# Patient Record
Sex: MALE | Race: BLACK OR AFRICAN AMERICAN | Employment: FULL TIME | URBAN - METROPOLITAN AREA
[De-identification: names, ages, dates, MRNs, and addresses within clinical notes are randomized per-mention and may not be internally consistent; named-entity substitution may affect disease eponyms.]

---

## 2017-04-23 ENCOUNTER — HOSPITAL ENCOUNTER (EMERGENCY)
Age: 22
Discharge: HOME | End: 2017-04-23
Attending: EMERGENCY MEDICINE | Admitting: EMERGENCY MEDICINE
Payer: SELF-PAY

## 2017-04-23 VITALS
HEART RATE: 69 BPM | WEIGHT: 280 LBS | OXYGEN SATURATION: 99 % | TEMPERATURE: 99 F | DIASTOLIC BLOOD PRESSURE: 113 MMHG | SYSTOLIC BLOOD PRESSURE: 162 MMHG | BODY MASS INDEX: 39.2 KG/M2 | RESPIRATION RATE: 18 BRPM | HEIGHT: 71 IN

## 2017-04-23 DIAGNOSIS — R03.0 ELEVATED BLOOD PRESSURE READING: ICD-10-CM

## 2017-04-23 DIAGNOSIS — H65.191 OTHER ACUTE NONSUPPURATIVE OTITIS MEDIA OF RIGHT EAR, RECURRENCE NOT SPECIFIED: Primary | ICD-10-CM

## 2017-04-23 DIAGNOSIS — J02.9 ACUTE PHARYNGITIS, UNSPECIFIED ETIOLOGY: ICD-10-CM

## 2017-04-23 PROCEDURE — 99282 EMERGENCY DEPT VISIT SF MDM: CPT

## 2017-04-23 PROCEDURE — 87081 CULTURE SCREEN ONLY: CPT | Performed by: EMERGENCY MEDICINE

## 2017-04-23 RX ORDER — IBUPROFEN 600 MG/1
600 TABLET ORAL
Qty: 20 TAB | Refills: 0 | Status: SHIPPED | OUTPATIENT
Start: 2017-04-23

## 2017-04-23 RX ORDER — AMOXICILLIN 500 MG/1
500 TABLET, FILM COATED ORAL 3 TIMES DAILY
Qty: 21 TAB | Refills: 0 | Status: SHIPPED | OUTPATIENT
Start: 2017-04-23 | End: 2017-04-30

## 2017-04-23 NOTE — ED NOTES
Patient advised to f/u with PCP related to elevation in blood pressure. Patient voices understanding.

## 2017-04-23 NOTE — DISCHARGE INSTRUCTIONS
Sore Throat: Care Instructions  Your Care Instructions    Infection by bacteria or a virus causes most sore throats. Cigarette smoke, dry air, air pollution, allergies, and yelling can also cause a sore throat. Sore throats can be painful and annoying. Fortunately, most sore throats go away on their own. If you have a bacterial infection, your doctor may prescribe antibiotics. Follow-up care is a key part of your treatment and safety. Be sure to make and go to all appointments, and call your doctor if you are having problems. It's also a good idea to know your test results and keep a list of the medicines you take. How can you care for yourself at home? · If your doctor prescribed antibiotics, take them as directed. Do not stop taking them just because you feel better. You need to take the full course of antibiotics. · Gargle with warm salt water once an hour to help reduce swelling and relieve discomfort. Use 1 teaspoon of salt mixed in 1 cup of warm water. · Take an over-the-counter pain medicine, such as acetaminophen (Tylenol), ibuprofen (Advil, Motrin), or naproxen (Aleve). Read and follow all instructions on the label. · Be careful when taking over-the-counter cold or flu medicines and Tylenol at the same time. Many of these medicines have acetaminophen, which is Tylenol. Read the labels to make sure that you are not taking more than the recommended dose. Too much acetaminophen (Tylenol) can be harmful. · Drink plenty of fluids. Fluids may help soothe an irritated throat. Hot fluids, such as tea or soup, may help decrease throat pain. · Use over-the-counter throat lozenges to soothe pain. Regular cough drops or hard candy may also help. These should not be given to young children because of the risk of choking. · Do not smoke or allow others to smoke around you. If you need help quitting, talk to your doctor about stop-smoking programs and medicines.  These can increase your chances of quitting for good. · Use a vaporizer or humidifier to add moisture to your bedroom. Follow the directions for cleaning the machine. When should you call for help? Call your doctor now or seek immediate medical care if:  · You have new or worse trouble swallowing. · Your sore throat gets much worse on one side. Watch closely for changes in your health, and be sure to contact your doctor if you do not get better as expected. Where can you learn more? Go to http://daisha-josé miguel.info/. Enter 062 [id number] in the search box to learn more about \"Sore Throat: Care Instructions. \"  Current as of: July 29, 2016  Content Version: 11.2  © 6093-8316 [employer]. Care instructions adapted under license by [employer] (which disclaims liability or warranty for this information). If you have questions about a medical condition or this instruction, always ask your healthcare professional. [place] [address] any warranty or liability for your use of this information. Elevated Blood Pressure: Care Instructions  Your Care Instructions    Blood pressure is a measure of how hard the blood pushes against the walls of your arteries. It's normal for blood pressure to go up and down throughout the day. But if it stays up over time, you have high blood pressure. Two numbers tell you your blood pressure. The first number is the systolic pressure. It shows how hard the blood pushes when your heart is pumping. The second number is the diastolic pressure. It shows how hard the blood pushes between heartbeats, when your heart is relaxed and filling with blood. An ideal blood pressure in adults is less than 120/80 (say \"120 over 80\"). High blood pressure is 140/90 or higher. You have high blood pressure if your top number is 140 or higher or your bottom number is 90 or higher, or both. The main test for high blood pressure is simple, fast, and painless.  To diagnose high blood pressure, your doctor will test your blood pressure at different times. After testing your blood pressure, your doctor may ask you to test it again when you are home. If you are diagnosed with high blood pressure, you can work with your doctor to make a long-term plan to manage it. Follow-up care is a key part of your treatment and safety. Be sure to make and go to all appointments, and call your doctor if you are having problems. It's also a good idea to know your test results and keep a list of the medicines you take. How can you care for yourself at home? · Do not smoke. Smoking increases your risk for heart attack and stroke. If you need help quitting, talk to your doctor about stop-smoking programs and medicines. These can increase your chances of quitting for good. · Stay at a healthy weight. · Try to limit how much sodium you eat to less than 2,300 milligrams (mg) a day. Your doctor may ask you to try to eat less than 1,500 mg a day. · Be physically active. Get at least 30 minutes of exercise on most days of the week. Walking is a good choice. You also may want to do other activities, such as running, swimming, cycling, or playing tennis or team sports. · Avoid or limit alcohol. Talk to your doctor about whether you can drink any alcohol. · Eat plenty of fruits, vegetables, and low-fat dairy products. Eat less saturated and total fats. · Learn how to check your blood pressure at home. When should you call for help? Call your doctor now or seek immediate medical care if:  · Your blood pressure is much higher than normal (such as 180/110 or higher). · You think high blood pressure is causing symptoms such as:  ¨ Severe headache. ¨ Blurry vision. Watch closely for changes in your health, and be sure to contact your doctor if:  · You do not get better as expected. Where can you learn more? Go to http://daisha-josé miguel.info/.   Enter Z353 in the search box to learn more about \"Elevated Blood Pressure: Care Instructions. \"  Current as of: October 19, 2016  Content Version: 11.2  © 1290-2281 [employer]. Care instructions adapted under license by [employer] (which disclaims liability or warranty for this information). If you have questions about a medical condition or this instruction, always ask your healthcare professional. [place] [address] any warranty or liability for your use of this information.

## 2017-04-23 NOTE — ED NOTES
Discharge instructions reviewed with patient. Patient voices understanding. Patient advised to follow up as directed on discharge instructions. Patient denies questions, needs or concerns at discharge regarding discharge instructions. Advised patient of need to update demographic information with registration prior to departing ER. Patient voiced understanding. No s/sx of distress noted. Armband removed and placed in shredder box. Patient received discharge instructions from Milton Figueroa, [facility].

## 2017-04-23 NOTE — LETTER
[address] [place] [address] [facility] 
[address] [facility] [address] [phone] Work/School Note Date: 4/23/2017 To Whom It May concern: 
 
Rere Horton was seen and treated today in the emergency room by the following provider(s): 
Physician Assistant: Dameon Giordano PA-C. Rere Horton may return to work on Wednesday, 4/26/2017. Sincerely, Dameon Giordano PA-C

## 2017-04-23 NOTE — ED PROVIDER NOTES
HPI Comments: Patient is a 19 y/o male who presents to the ER c/o sore throat, and right ear pain. Patient states his throat began bothering him about 1 week ago. He has tried taking OTC meds with little relief. He reports a previous hx of strep in the past.  He has also had some right sided ear pain. Patient states he feels like there is a lot of drainage, and congestion in his ear. He denied any fevers, chills, chest pain, SOB, difficulty swallowing, and denied all other complaints. Patient is a 20 y.o. male presenting with sore throat and ear pain. The history is provided by the patient. Sore Throat    Associated symptoms include congestion and ear pain. Pertinent negatives include no vomiting, no headaches, no shortness of breath and no cough. Ear Pain    Associated symptoms include sore throat. Pertinent negatives include no headaches, no abdominal pain, no vomiting and no cough. Past Medical History:   Diagnosis Date    Acne     Headache        History reviewed. No pertinent surgical history. Family History:   Problem Relation Age of Onset    Hypertension Father        Social History     Social History    Marital status: SINGLE     Spouse name: N/A    Number of children: N/A    Years of education: N/A     Occupational History    Not on file. Social History Main Topics    Smoking status: Never Smoker    Smokeless tobacco: Not on file    Alcohol use No    Drug use: No    Sexual activity: Not on file     Other Topics Concern    Not on file     Social History Narrative         ALLERGIES: Azithromycin    Review of Systems   Constitutional: Negative for chills, fatigue and fever. HENT: Positive for congestion, ear pain and sore throat. Eyes: Negative. Respiratory: Negative for cough and shortness of breath. Cardiovascular: Negative for chest pain and palpitations. Gastrointestinal: Negative for abdominal pain, nausea and vomiting.    Genitourinary: Negative for dysuria. Musculoskeletal: Negative. Skin: Negative. Neurological: Negative for dizziness, weakness, light-headedness and headaches. Psychiatric/Behavioral: Negative. All other systems reviewed and are negative. Vitals:    04/23/17 1838   BP: (!) 167/119   Pulse: 69   Resp: 20   Temp: 99 °F (37.2 °C)   SpO2: 99%   Weight: 127 kg (280 lb)   Height: 5' 11\" (1.803 m)            Physical Exam   Constitutional: He is oriented to person, place, and time. He appears well-developed and well-nourished. No distress. HENT:   Head: Normocephalic and atraumatic. Right Ear: Tympanic membrane is erythematous. A middle ear effusion is present. Left Ear: Tympanic membrane normal.   Mouth/Throat: Oropharynx is clear and moist.   Eyes: Conjunctivae are normal. No scleral icterus. Neck: Neck supple. No JVD present. No tracheal deviation present. Cardiovascular: Normal rate, regular rhythm and normal heart sounds. Pulmonary/Chest: Effort normal and breath sounds normal. No respiratory distress. He has no wheezes. Abdominal: Soft. He exhibits no distension. There is no tenderness. There is no rebound and no guarding. Musculoskeletal: Normal range of motion. Neurological: He is alert and oriented to person, place, and time. He has normal strength. Gait normal. GCS eye subscore is 4. GCS verbal subscore is 5. GCS motor subscore is 6. Skin: Skin is warm and dry. He is not diaphoretic. Psychiatric: He has a normal mood and affect. Nursing note and vitals reviewed. MDM  Number of Diagnoses or Management Options  Diagnosis management comments: 6:57 PM  21 y/o male who presents to the ER c/o sore throat, ear pain x 1 week. He has tried OTC meds with little relief. Based on PE, most likely AOM. No signs of strep. Will plan on abx for ear infection and give work note. All questions answered and patient in agreement with plan of care. Will plan for discharge.   Ynes Conway PA-C    Clinical Impression:  AOM, acute Pharyngitis, elevated blood pressure    One or more blood pressure readings were noted elevated during the Pt's presentation in the emergency department this date. This abnormal reading has been cited in the Pt's diagnosis, and they have been encouraged to follow up with their primary care physician, or referred to a consultant for further evaluation and treatment.           Amount and/or Complexity of Data Reviewed  Clinical lab tests: ordered and reviewed    Risk of Complications, Morbidity, and/or Mortality  Presenting problems: low  Diagnostic procedures: low  Management options: low    Patient Progress  Patient progress: stable    ED Course       Procedures

## 2017-04-25 LAB
B-HEM STREP THROAT QL CULT: NEGATIVE
BACTERIA SPEC CULT: NORMAL
SERVICE CMNT-IMP: NORMAL

## 2018-09-02 ENCOUNTER — HOSPITAL ENCOUNTER (EMERGENCY)
Age: 23
Discharge: HOME | End: 2018-09-02
Attending: EMERGENCY MEDICINE
Payer: SELF-PAY

## 2018-09-02 VITALS
DIASTOLIC BLOOD PRESSURE: 99 MMHG | BODY MASS INDEX: 36.12 KG/M2 | HEART RATE: 76 BPM | RESPIRATION RATE: 19 BRPM | WEIGHT: 258 LBS | OXYGEN SATURATION: 100 % | TEMPERATURE: 99 F | SYSTOLIC BLOOD PRESSURE: 154 MMHG | HEIGHT: 71 IN

## 2018-09-02 DIAGNOSIS — R21 RASH AND OTHER NONSPECIFIC SKIN ERUPTION: Primary | ICD-10-CM

## 2018-09-02 DIAGNOSIS — R03.0 ELEVATED BLOOD PRESSURE READING: ICD-10-CM

## 2018-09-02 PROCEDURE — 86780 TREPONEMA PALLIDUM: CPT | Performed by: PHYSICIAN ASSISTANT

## 2018-09-02 PROCEDURE — 99282 EMERGENCY DEPT VISIT SF MDM: CPT

## 2018-09-02 PROCEDURE — 86592 SYPHILIS TEST NON-TREP QUAL: CPT | Performed by: PHYSICIAN ASSISTANT

## 2018-09-02 PROCEDURE — 86593 SYPHILIS TEST NON-TREP QUANT: CPT | Performed by: PHYSICIAN ASSISTANT

## 2018-09-02 RX ORDER — DIPHENHYDRAMINE HCL 25 MG
CAPSULE ORAL
Qty: 16 CAP | Refills: 0 | Status: SHIPPED | OUTPATIENT
Start: 2018-09-02

## 2018-09-02 RX ORDER — METHYLPREDNISOLONE 4 MG/1
TABLET ORAL
Qty: 1 DOSE PACK | Refills: 0 | Status: SHIPPED | OUTPATIENT
Start: 2018-09-02

## 2018-09-02 NOTE — DISCHARGE INSTRUCTIONS
Elevated Blood Pressure: Care Instructions  Your Care Instructions    Blood pressure is a measure of how hard the blood pushes against the walls of your arteries. It's normal for blood pressure to go up and down throughout the day. But if it stays up over time, you have high blood pressure. Two numbers tell you your blood pressure. The first number is the systolic pressure. It shows how hard the blood pushes when your heart is pumping. The second number is the diastolic pressure. It shows how hard the blood pushes between heartbeats, when your heart is relaxed and filling with blood. An ideal blood pressure in adults is less than 120/80 (say \"120 over 80\"). High blood pressure is 140/90 or higher. You have high blood pressure if your top number is 140 or higher or your bottom number is 90 or higher, or both. The main test for high blood pressure is simple, fast, and painless. To diagnose high blood pressure, your doctor will test your blood pressure at different times. After testing your blood pressure, your doctor may ask you to test it again when you are home. If you are diagnosed with high blood pressure, you can work with your doctor to make a long-term plan to manage it. Follow-up care is a key part of your treatment and safety. Be sure to make and go to all appointments, and call your doctor if you are having problems. It's also a good idea to know your test results and keep a list of the medicines you take. How can you care for yourself at home? · Do not smoke. Smoking increases your risk for heart attack and stroke. If you need help quitting, talk to your doctor about stop-smoking programs and medicines. These can increase your chances of quitting for good. · Stay at a healthy weight. · Try to limit how much sodium you eat to less than 2,300 milligrams (mg) a day. Your doctor may ask you to try to eat less than 1,500 mg a day. · Be physically active.  Get at least 30 minutes of exercise on most days of the week. Walking is a good choice. You also may want to do other activities, such as running, swimming, cycling, or playing tennis or team sports. · Avoid or limit alcohol. Talk to your doctor about whether you can drink any alcohol. · Eat plenty of fruits, vegetables, and low-fat dairy products. Eat less saturated and total fats. · Learn how to check your blood pressure at home. When should you call for help? Call your doctor now or seek immediate medical care if:  ? · Your blood pressure is much higher than normal (such as 180/110 or higher). ? · You think high blood pressure is causing symptoms such as:  ¨ Severe headache. ¨ Blurry vision. ? Watch closely for changes in your health, and be sure to contact your doctor if:  ? · You do not get better as expected. Where can you learn more? Go to http://daishaRapid Micro Biosystemsjosé miguel.info/. Enter T451 in the search box to learn more about \"Elevated Blood Pressure: Care Instructions. \"  Current as of: September 21, 2016  Content Version: 11.4  © 8491-7557 [employer]. Care instructions adapted under license by [employer] (which disclaims liability or warranty for this information). If you have questions about a medical condition or this instruction, always ask your healthcare professional. [address] any warranty or liability for your use of this information. Rash: Care Instructions  Your Care Instructions  A rash is any irritation or inflammation of the skin. Rashes have many possible causes, including allergy, infection, illness, heat, and emotional stress. Follow-up care is a key part of your treatment and safety. Be sure to make and go to all appointments, and call your doctor if you are having problems. It's also a good idea to know your test results and keep a list of the medicines you take. How can you care for yourself at home? · Wash the area with water only.  Soap can make dryness and itching worse. Pat dry. · Put cold, wet cloths on the rash to reduce itching. · Keep cool, and stay out of the sun. · Leave the rash open to the air as much of the time as possible. · Sometimes petroleum jelly (Vaseline) can help relieve the discomfort caused by a rash. A moisturizing lotion, such as Cetaphil, also may help. Calamine lotion may help for rashes caused by contact with something (such as a plant or soap) that irritated the skin. Use it 3 or 4 times a day. · If your doctor prescribed a cream, use it as directed. If your doctor prescribed medicine, take it exactly as directed. · If your rash itches so badly that it interferes with your normal activities, take an over-the-counter antihistamine, such as diphenhydramine (Benadryl) or loratadine (Claritin). Read and follow all instructions on the label. When should you call for help? Call your doctor now or seek immediate medical care if:    · You have signs of infection, such as:  ¨ Increased pain, swelling, warmth, or redness. ¨ Red streaks leading from the area. ¨ Pus draining from the area. ¨ A fever.     · You have joint pain along with the rash.    Watch closely for changes in your health, and be sure to contact your doctor if:    · Your rash is changing or getting worse. For example, call if you have pain along with the rash, the rash is spreading, or you have new blisters.     · You do not get better after 1 week. Where can you learn more? Go to http://daisha-josé miguel.info/. Enter C450 in the search box to learn more about \"Rash: Care Instructions. \"  Current as of: October 5, 2017  Content Version: 11.7  © 7023-7012 [employer]. Care instructions adapted under license by [employer] (which disclaims liability or warranty for this information).  If you have questions about a medical condition or this instruction, always ask your healthcare professional. Julio Kaiser disclaims any warranty or liability for your use of this information. MyChart Activation    Thank you for requesting access to [employer]. Please follow the instructions below to securely access and download your online medical record. [employer] allows you to send messages to your doctor, view your test results, renew your prescriptions, schedule appointments, and more. How Do I Sign Up? 1. In your internet browser, go to [email or web address]  2. Click on the First Time User? Click Here link in the Sign In box. You will be redirect to the New Member Sign Up page. 3. Enter your [employer] Access Code exactly as it appears below. You will not need to use this code after youve completed the sign-up process. If you do not sign up before the expiration date, you must request a new code. [employer] Access Code: [email or web address]  Expires: 2018  7:09 PM (This is the date your [employer] access code will )    4. Enter the last four digits of your Social Security Number (xxxx) and Date of Birth (mm/dd/yyyy) as indicated and click Submit. You will be taken to the next sign-up page. 5. Create a [employer] ID. This will be your [employer] login ID and cannot be changed, so think of one that is secure and easy to remember. 6. Create a [employer] password. You can change your password at any time. 7. Enter your Password Reset Question and Answer. This can be used at a later time if you forget your password. 8. Enter your e-mail address. You will receive e-mail notification when new information is available in [address] 9. Click Sign Up. You can now view and download portions of your medical record. 10. Click the Download Summary menu link to download a portable copy of your medical information. Additional Information    If you have questions, please visit the Frequently Asked Questions section of the [employer] website at [email or web address] Remember, [employer] is NOT to be used for urgent needs.  For medical emergencies, dial 911.

## 2018-09-02 NOTE — ED PROVIDER NOTES
EMERGENCY DEPARTMENT HISTORY AND PHYSICAL EXAM 
 
7:50 PM 
 
 
Date: 9/2/2018 Patient Name: Nico Lundberg History of Presenting Illness Chief Complaint Patient presents with  Rash History Provided By: Patient Chief Complaint: rash Duration:  Days Timing:  Constant Location: diffuse Quality: pruritic Severity: Moderate Modifying Factors: none Associated Symptoms: denies any other associated signs or symptoms Additional History (Context): Nico Lundberg is a 24 y.o. male with No significant past medical history who presents with c/o diffuse pruritic rash x 1 week. Pt denies fever/chills, cough, sore throat. Denies new lotions/detergents/foods. Denies any one else with similar rash. No hx of STIs. PCP: Elena Metz MD 
 
Current Outpatient Prescriptions Medication Sig Dispense Refill  methylPREDNISolone (MEDROL, MARK,) 4 mg tablet Take medication as prescribed 1 Dose Pack 0  
 diphenhydrAMINE (BENADRYL) 25 mg capsule Take 1-2 tabs every 6 hours as needed. 16 Cap 0  ibuprofen (MOTRIN) 600 mg tablet Take 1 Tab by mouth every eight (8) hours as needed for Pain. 20 Tab 0 Past History Past Medical History: 
Past Medical History:  
Diagnosis Date  Acne  Headache(784.0) Past Surgical History: 
History reviewed. No pertinent surgical history. Family History: 
Family History Problem Relation Age of Onset  Hypertension Father Social History: 
Social History Substance Use Topics  Smoking status: Never Smoker  Smokeless tobacco: Never Used  Alcohol use No  
 
 
Allergies: Allergies Allergen Reactions  Azithromycin Unknown (comments) Review of Systems Review of Systems Constitutional: Negative for chills and fever. Respiratory: Negative for shortness of breath. Cardiovascular: Negative for chest pain. Gastrointestinal: Negative for abdominal pain, nausea and vomiting. Skin: Positive for rash. Neurological: Negative for weakness. All other systems reviewed and are negative. Physical Exam  
 
Visit Vitals  BP (!) 154/99 (BP 1 Location: Left arm, BP Patient Position: At rest)  Pulse 76  Temp 99 °F (37.2 °C)  Resp 19  
 Ht 5' 11\" (1.803 m)  Wt 117 kg (258 lb)  SpO2 100%  BMI 35.98 kg/m2 Physical Exam  
Constitutional: He appears well-developed and well-nourished. No distress. HENT:  
Head: Normocephalic and atraumatic. Neck: Normal range of motion. Neck supple. Cardiovascular: Normal rate, regular rhythm and normal heart sounds. Exam reveals no gallop and no friction rub. No murmur heard. Pulmonary/Chest: Effort normal and breath sounds normal. No stridor. No respiratory distress. He has no wheezes. He has no rales. Lymphadenopathy:  
  He has no cervical adenopathy. Neurological: He is alert. Skin: Skin is warm. Rash (diffuse, erythematous macular rash throughout abdomen, back, and extremities including soles of feet and palms, no fluctuance, no streaking, no vesicles) noted. He is not diaphoretic. Nursing note and vitals reviewed. Diagnostic Study Results Labs - No results found for this or any previous visit (from the past 12 hour(s)). Radiologic Studies - No orders to display Medical Decision Making I am the first provider for this patient. I reviewed the vital signs, available nursing notes, past medical history, past surgical history, family history and social history. Vital Signs-Reviewed the patient's vital signs. Records Reviewed: Nursing Notes and Old Medical Records (Time of Review: 7:50 PM) ED Course: Progress Notes, Reevaluation, and Consults: 
7:50 PM  Reviewed plan with patient. Discussed need for close outpatient follow-up with dermatologist. Discussed strict return precautions, including fever, increased rash, or any other medical concerns. Provider Notes (Medical Decision Making): 25 yo M who presents due to diffuse pruritic rash, non-specific. Otherwise asymptomatic. Afebrile, VS stable. Non-toxic appearing. No evidence of bed bugs, scabies. Will place patient on Medrol dose pack, Benadryl, and referral to dermatologist. Elevated blood pressure noted, asymptomatic, printed out return precautions and discussed importance of follow-up. . Stable for d/c with outpatient follow-up. Diagnosis Clinical Impression: 1. Rash and other nonspecific skin eruption 2. Elevated blood pressure reading Disposition: home Follow-up Information Follow up With Details Comments Contact Info [id number] [facility] EMERGENCY DEPT  If symptoms worsen [address] [place] [address] [phone] Bebe Callahan MD In 2 days  [place] [place] [address] [address] [place] [address] 
[phone] Junior Trimble MD Schedule an appointment as soon as possible for a visit  [address] [facility] [place] [address] 
[phone] Patient's Medications Start Taking DIPHENHYDRAMINE (BENADRYL) 25 MG CAPSULE    Take 1-2 tabs every 6 hours as needed. METHYLPREDNISOLONE (MEDROL, MARK,) 4 MG TABLET    Take medication as prescribed Continue Taking IBUPROFEN (MOTRIN) 600 MG TABLET    Take 1 Tab by mouth every eight (8) hours as needed for Pain. These Medications have changed No medications on file Stop Taking No medications on file

## 2018-09-03 LAB
RPR SER QL: ABNORMAL
RPR SER-TITR: NORMAL {TITER}
T PALLIDUM AB SER QL IA: ABNORMAL

## 2018-09-04 ENCOUNTER — HOSPITAL ENCOUNTER (EMERGENCY)
Age: 23
Discharge: HOME | End: 2018-09-04
Attending: EMERGENCY MEDICINE
Payer: SELF-PAY

## 2018-09-04 VITALS
DIASTOLIC BLOOD PRESSURE: 111 MMHG | HEART RATE: 85 BPM | TEMPERATURE: 98.8 F | BODY MASS INDEX: 35.67 KG/M2 | RESPIRATION RATE: 18 BRPM | SYSTOLIC BLOOD PRESSURE: 157 MMHG | WEIGHT: 254.8 LBS | OXYGEN SATURATION: 97 % | HEIGHT: 71 IN

## 2018-09-04 DIAGNOSIS — A53.9 SYPHILIS: Primary | ICD-10-CM

## 2018-09-04 PROCEDURE — 96372 THER/PROPH/DIAG INJ SC/IM: CPT

## 2018-09-04 PROCEDURE — 74011250636 HC RX REV CODE- 250/636: Performed by: PHYSICIAN ASSISTANT

## 2018-09-04 PROCEDURE — 99282 EMERGENCY DEPT VISIT SF MDM: CPT

## 2018-09-04 RX ADMIN — PENICILLIN G BENZATHINE 2.4 MILLION UNITS: 1200000 INJECTION, SUSPENSION INTRAMUSCULAR at 21:52

## 2018-09-05 NOTE — DISCHARGE INSTRUCTIONS
Syphilis: Care Instructions  Your Care Instructions  Syphilis is an infection caused by bacteria. It's usually spread through sex. It is one of several types of sexually transmitted infections (STIs). The first symptom is usually a painless, red sore on the genitals, rectal area, or mouth. This type of sore is called a chancre (say \"SHANK-er\"). Later, you may get other symptoms. These include a rash, a fever, and swollen lymph nodes. Your hair may start to fall out. Or you may feel like you have the flu. Sometimes these symptoms go away on their own. But this doesn't mean that the infection is gone. If you don't treat syphilis with antibiotics, the infection can spread in your body. You can also spread it to others. Antibiotics can cure syphilis and prevent more serious complications. Both you and your sex partner or partners need antibiotic treatment. This is to prevent you from passing the infection back and forth or to other sex partners. During the first 24 hours of treatment, you may have a fever, a headache, and muscle aches. After treatment, you will get blood tests to make sure you don't have any more bacteria in your body. You may also want to be tested for other STIs. Follow-up care is a key part of your treatment and safety. Be sure to make and go to all appointments, and call your doctor if you are having problems. It's also a good idea to know your test results and keep a list of the medicines you take. How can you care for yourself at home? · Your doctor probably gave you a shot of antibiotics. If you've had syphilis for a while, you may need 2 more shots. It's very important to get all the recommended shots. · If your doctor prescribed antibiotic pills, take them as directed. Do not stop taking them just because you feel better. You need to take the full course of antibiotics. · Do not have sexual contact with anyone while you are being treated.  After treatment, wait at least 7 days and until all of your sores are healed before you have any sexual contact. Even if you use a condom, you and your partner may pass the infection back and forth. · Wash your hands if you touch an infected area. This will help prevent spreading the infection to other parts of your body or to other people. · Tell your sex partner or partners that you have syphilis. They should get treatment even if they don't have symptoms. To prevent syphilis in the future  · Use latex condoms every time you have sex. Use them from the start to the end of sexual contact. · Talk to your partner before you have sex. Find out if he or she has or is at risk for syphilis or any other STI. Remember that a person without symptoms may still be able to spread an STI. · Do not have sex or any type of sexual contact if you are being treated for syphilis or any other STI. · Do not have sex with anyone who has symptoms of an STI. These include sores on the genitals or mouth. · Having one sex partner (who does not have STIs and does not have sex with anyone else) is a good way to avoid STIs. When should you call for help? Watch closely for changes in your health, and be sure to contact your doctor if:    · You do not get better as expected.     · Your symptoms continue or come back after treatment.     · You develop new symptoms, such as a fever. Where can you learn more? Go to http://daisha-josé miguel.info/. Enter Z000 in the search box to learn more about \"Syphilis: Care Instructions. \"  Current as of: November 27, 2017  Content Version: 11.7  © 7613-2897 [employer]. Care instructions adapted under license by [employer] (which disclaims liability or warranty for this information). If you have questions about a medical condition or this instruction, always ask your healthcare professional. [address] any warranty or liability for your use of this information.

## 2018-09-05 NOTE — ED PROVIDER NOTES
EMERGENCY DEPARTMENT HISTORY AND PHYSICAL EXAM 
 
Date: 9/4/2018 Patient Name: Gómez Diehl History of Presenting Illness Chief Complaint Patient presents with  Rash History Provided By: Patient Chief Complaint: received call to come in for treatment Duration: today Timing:  N/A Location: generalized rash Quality: n/a Severity: N/A Modifying Factors: he is taking benadryl and prednisone w/o relief Associated Symptoms: denies any other associated signs or symptoms Additional History (Context): Gómez Diehl is a 24 y.o. male with No significant past medical history who presents with generalized rash. Seen here 2 days ago for same and had blood work drawn for possible syphilis. Received a call today that results were positive and he needs treatment. He has already called and informed his partner. He has no new complaints or concerns. PCP: Jasvir Castrejon MD 
 
Current Outpatient Prescriptions Medication Sig Dispense Refill  methylPREDNISolone (MEDROL, MARK,) 4 mg tablet Take medication as prescribed 1 Dose Pack 0  
 diphenhydrAMINE (BENADRYL) 25 mg capsule Take 1-2 tabs every 6 hours as needed. 16 Cap 0  ibuprofen (MOTRIN) 600 mg tablet Take 1 Tab by mouth every eight (8) hours as needed for Pain. 20 Tab 0 Past History Past Medical History: 
Past Medical History:  
Diagnosis Date  Acne  Headache(784.0) Past Surgical History: 
History reviewed. No pertinent surgical history. Family History: 
Family History Problem Relation Age of Onset  Hypertension Father Social History: 
Social History Substance Use Topics  Smoking status: Never Smoker  Smokeless tobacco: Never Used  Alcohol use No  
 
 
Allergies: Allergies Allergen Reactions  Azithromycin Unknown (comments) Review of Systems Review of Systems Genitourinary: Negative for genital sores. Skin: Positive for rash. All other systems reviewed and are negative. All Other Systems Negative Physical Exam  
 
Vitals:  
 09/04/18 2132 BP: (!) 157/111 Pulse: 85 Resp: 18 Temp: 98.8 °F (37.1 °C) SpO2: 97% Weight: 115.6 kg (254 lb 12.8 oz) Height: 5' 11\" (1.803 m) Physical Exam  
Constitutional: He is oriented to person, place, and time. He appears well-developed and well-nourished. No distress. HENT:  
Head: Normocephalic and atraumatic. Right Ear: External ear normal.  
Left Ear: External ear normal.  
Eyes: Conjunctivae are normal.  
Neck: Normal range of motion. Neck supple. Neurological: He is oriented to person, place, and time. Skin: Skin is warm and dry. He is not diaphoretic. Generalized hyperpigmented macular rash Psychiatric:  
Anxious, nervous Diagnostic Study Results Labs - No results found for this or any previous visit (from the past 12 hour(s)). Radiologic Studies - No orders to display CT Results  (Last 48 hours) None CXR Results  (Last 48 hours) None Medical Decision Making I am the first provider for this patient. I reviewed the vital signs, available nursing notes, past medical history, past surgical history, family history and social history. Vital Signs-Reviewed the patient's vital signs. Pulse Oximetry Analysis - 97% on RA Records Reviewed: Nursing Notes and Previous Laboratory Studies Procedures: 
Procedures Provider Notes (Medical Decision Making): Pt presents for treatment for syphilis. Has reactive rpr and +t pallidum from blood work 2 days ago. States he has no questions as he did research prior to arrival. No hx of htn but bp elevated as pt is very anxious/nervous in triage. MED RECONCILIATION: 
No current facility-administered medications for this encounter. Current Outpatient Prescriptions Medication Sig  
 methylPREDNISolone (MEDROL, MARK,) 4 mg tablet Take medication as prescribed  diphenhydrAMINE (BENADRYL) 25 mg capsule Take 1-2 tabs every 6 hours as needed.  ibuprofen (MOTRIN) 600 mg tablet Take 1 Tab by mouth every eight (8) hours as needed for Pain. Disposition: D/c to home DISCHARGE NOTE:  
 
Pt has been reexamined. Patient has no new complaints, changes, or physical findings. Care plan outlined and precautions discussed. Results of labs were reviewed with the patient. All medications were reviewed with the patient. All of pt's questions and concerns were addressed. Patient was instructed and agrees to follow up with clinic, as well as to return to the ED upon further deterioration. Patient is ready to go home. Follow-up Information Follow up With Details Comments Contact Info Alina Sanders MD   [address] 
[phone] Current Discharge Medication List  
  
 
 
 
Diagnosis Clinical Impression: 1. Syphilis

## 2021-03-18 ENCOUNTER — HOSPITAL ENCOUNTER (EMERGENCY)
Age: 26
Discharge: HOME | End: 2021-03-18
Attending: EMERGENCY MEDICINE

## 2021-03-18 VITALS
TEMPERATURE: 98.6 F | HEIGHT: 71 IN | HEART RATE: 84 BPM | OXYGEN SATURATION: 100 % | DIASTOLIC BLOOD PRESSURE: 101 MMHG | BODY MASS INDEX: 39.62 KG/M2 | RESPIRATION RATE: 18 BRPM | SYSTOLIC BLOOD PRESSURE: 157 MMHG | WEIGHT: 283 LBS

## 2021-03-18 DIAGNOSIS — K62.9 ANAL LESION: Primary | ICD-10-CM

## 2021-03-18 PROCEDURE — 99282 EMERGENCY DEPT VISIT SF MDM: CPT

## 2021-03-18 PROCEDURE — 87255 GENET VIRUS ISOLATE HSV: CPT

## 2021-03-18 RX ORDER — VALACYCLOVIR HYDROCHLORIDE 1 G/1
1000 TABLET, FILM COATED ORAL 3 TIMES DAILY
Qty: 30 TAB | Refills: 0 | Status: SHIPPED | OUTPATIENT
Start: 2021-03-18 | End: 2021-03-18 | Stop reason: SDUPTHER

## 2021-03-18 RX ORDER — VALACYCLOVIR HYDROCHLORIDE 1 G/1
1000 TABLET, FILM COATED ORAL 3 TIMES DAILY
Qty: 30 TAB | Refills: 0 | Status: SHIPPED | OUTPATIENT
Start: 2021-03-18 | End: 2021-03-28

## 2021-03-18 NOTE — ED PROVIDER NOTES
EMERGENCY DEPARTMENT HISTORY AND PHYSICAL EXAM    Date: 3/18/2021  Patient Name: Neri Nunes    History of Presenting Illness     Chief Complaint   Patient presents with    Hemorrhoids         History Provided By: Patient      Additional History (Context): Neri Nunes is a 31 y.o. male with obesity who presents with complaint of possibly painful hemorrhoids for the past 3 weeks. Has not had previously. Denies any rectal bleeding. Came in today because of the pain. PCP: Carol Lezama MD    Current Outpatient Medications   Medication Sig Dispense Refill    valACYclovir (VALTREX) 1 gram tablet Take 1 Tab by mouth three (3) times daily for 10 days. 30 Tab 0    methylPREDNISolone (MEDROL, MARK,) 4 mg tablet Take medication as prescribed 1 Dose Pack 0    diphenhydrAMINE (BENADRYL) 25 mg capsule Take 1-2 tabs every 6 hours as needed. 16 Cap 0    ibuprofen (MOTRIN) 600 mg tablet Take 1 Tab by mouth every eight (8) hours as needed for Pain. 20 Tab 0       Past History     Past Medical History:  Past Medical History:   Diagnosis Date    Acne     Headache(784.0)        Past Surgical History:  History reviewed. No pertinent surgical history. Family History:  Family History   Problem Relation Age of Onset    Hypertension Father        Social History:  Social History     Tobacco Use    Smoking status: Never Smoker    Smokeless tobacco: Never Used   Substance Use Topics    Alcohol use: No    Drug use: No       Allergies: Allergies   Allergen Reactions    Azithromycin Unknown (comments)         Review of Systems   Review of Systems   Constitutional: Negative for fever. Gastrointestinal: Positive for rectal pain. Negative for blood in stool.      All Other Systems Negative  Physical Exam     Vitals:    03/18/21 1244   BP: (!) 157/101   Pulse: 84   Resp: 18   Temp: 98.6 °F (37 °C)   SpO2: 100%   Weight: 128.4 kg (283 lb)   Height: 5' 11\" (1.803 m)     Physical Exam  Vitals signs and nursing note reviewed. Constitutional:       General: He is not in acute distress. Appearance: He is well-developed. He is obese. He is not ill-appearing, toxic-appearing or diaphoretic. HENT:      Head: Normocephalic and atraumatic. Neck:      Musculoskeletal: Normal range of motion and neck supple. Thyroid: No thyromegaly. Vascular: No carotid bruit. Trachea: No tracheal deviation. Cardiovascular:      Rate and Rhythm: Normal rate and regular rhythm. Heart sounds: Normal heart sounds. No murmur. No friction rub. No gallop. Pulmonary:      Effort: Pulmonary effort is normal. No respiratory distress. Breath sounds: Normal breath sounds. No stridor. No wheezing or rales. Chest:      Chest wall: No tenderness. Abdominal:      General: There is no distension. Palpations: Abdomen is soft. There is no mass. Tenderness: There is no abdominal tenderness. There is no guarding or rebound. Genitourinary:     Comments: To the left of the anus there is a plaque of maculopapular erythematous lesions mostly unroofed vesicles approximately 2 x 3 cm. This is at the 9 o'clock position. At the 11 o'clock position there are several small tender vesicles as well. No external hemorrhoids or bleeding of note. Musculoskeletal: Normal range of motion. Skin:     General: Skin is warm and dry. Coloration: Skin is not pale. Neurological:      Mental Status: He is alert. Psychiatric:         Speech: Speech normal.         Behavior: Behavior normal.         Thought Content: Thought content normal.         Judgment: Judgment normal.            Diagnostic Study Results     Labs -   No results found for this or any previous visit (from the past 12 hour(s)). Radiologic Studies -   No orders to display     CT Results  (Last 48 hours)    None        CXR Results  (Last 48 hours)    None            Medical Decision Making   I am the first provider for this patient.     I reviewed the vital signs, available nursing notes, past medical history, past surgical history, family history and social history. Vital Signs-Reviewed the patient's vital signs. Procedures:  Procedures    Provider Notes (Medical Decision Making): Treat his likely hemorrhoids and sent for HSV swabs. Other differential considerations include genital warts. MED RECONCILIATION:  No current facility-administered medications for this encounter. Current Outpatient Medications   Medication Sig    valACYclovir (VALTREX) 1 gram tablet Take 1 Tab by mouth three (3) times daily for 10 days.  methylPREDNISolone (MEDROL, MARK,) 4 mg tablet Take medication as prescribed    diphenhydrAMINE (BENADRYL) 25 mg capsule Take 1-2 tabs every 6 hours as needed.  ibuprofen (MOTRIN) 600 mg tablet Take 1 Tab by mouth every eight (8) hours as needed for Pain. Disposition:  home    DISCHARGE NOTE:   1:53 PM    Pt has been reexamined. Patient has no new complaints, changes, or physical findings. Care plan outlined and precautions discussed. Results of labs were reviewed with the patient. All medications were reviewed with the patient; will d/c home with valtrex. All of pt's questions and concerns were addressed. Patient was instructed and agrees to follow up with PCP, as well as to return to the ED upon further deterioration. Patient is ready to go home. Follow-up Information     Follow up With Specialties Details Why Contact Info    Berto Sepulveda MD Family Medicine Schedule an appointment as soon as possible for a visit in 3 days  [address] [facility]  [address] [place] [id number] [facility] DEPT Emergency Medicine  If symptoms worsen return immediately [address] [facility]          Current Discharge Medication List      START taking these medications    Details   valACYclovir (VALTREX) 1 gram tablet Take 1 Tab by mouth three (3) times daily for 10 days.   Qty: 30 Tab, Refills: 0               Diagnosis     Clinical Impression:   1.  Anal lesion

## 2021-03-18 NOTE — ED NOTES
I have reviewed discharge instructions with the patient. The patient verbalized understanding. Current Discharge Medication List      START taking these medications    Details   valACYclovir (VALTREX) 1 gram tablet Take 1 Tab by mouth three (3) times daily for 10 days.   Qty: 30 Tab, Refills: 0

## 2021-03-20 LAB
HSV SPEC CULT: NORMAL
SPECIMEN SOURCE: NORMAL